# Patient Record
Sex: FEMALE | Race: WHITE | NOT HISPANIC OR LATINO | URBAN - METROPOLITAN AREA
[De-identification: names, ages, dates, MRNs, and addresses within clinical notes are randomized per-mention and may not be internally consistent; named-entity substitution may affect disease eponyms.]

---

## 2017-05-17 ENCOUNTER — OFFICE (OUTPATIENT)
Dept: URBAN - METROPOLITAN AREA CLINIC 33 | Facility: CLINIC | Age: 77
End: 2017-05-17
Payer: OTHER GOVERNMENT

## 2017-05-17 VITALS
HEART RATE: 78 BPM | TEMPERATURE: 97.5 F | DIASTOLIC BLOOD PRESSURE: 62 MMHG | HEIGHT: 66 IN | WEIGHT: 149 LBS | SYSTOLIC BLOOD PRESSURE: 99 MMHG

## 2017-05-17 DIAGNOSIS — K21.0 GASTRO-ESOPHAGEAL REFLUX DISEASE WITH ESOPHAGITIS: ICD-10-CM

## 2017-05-17 DIAGNOSIS — M34.89 OTHER SYSTEMIC SCLEROSIS: ICD-10-CM

## 2017-05-17 PROCEDURE — 99213 OFFICE O/P EST LOW 20 MIN: CPT

## 2018-05-01 ENCOUNTER — OFFICE (OUTPATIENT)
Dept: URBAN - METROPOLITAN AREA CLINIC 101 | Facility: CLINIC | Age: 78
End: 2018-05-01
Payer: OTHER GOVERNMENT

## 2018-05-01 VITALS
WEIGHT: 150 LBS | HEIGHT: 66 IN | TEMPERATURE: 97.9 F | HEART RATE: 75 BPM | SYSTOLIC BLOOD PRESSURE: 169 MMHG | DIASTOLIC BLOOD PRESSURE: 86 MMHG

## 2018-05-01 DIAGNOSIS — K21.0 GASTRO-ESOPHAGEAL REFLUX DISEASE WITH ESOPHAGITIS: ICD-10-CM

## 2018-05-01 DIAGNOSIS — K59.00 CONSTIPATION, UNSPECIFIED: ICD-10-CM

## 2018-05-01 DIAGNOSIS — M34.89 OTHER SYSTEMIC SCLEROSIS: ICD-10-CM

## 2018-05-01 PROCEDURE — 99214 OFFICE O/P EST MOD 30 MIN: CPT

## 2019-06-18 ENCOUNTER — OFFICE (OUTPATIENT)
Dept: URBAN - METROPOLITAN AREA CLINIC 33 | Facility: CLINIC | Age: 79
End: 2019-06-18
Payer: OTHER GOVERNMENT

## 2019-06-18 VITALS
TEMPERATURE: 97.9 F | DIASTOLIC BLOOD PRESSURE: 102 MMHG | HEART RATE: 83 BPM | WEIGHT: 151 LBS | SYSTOLIC BLOOD PRESSURE: 172 MMHG | SYSTOLIC BLOOD PRESSURE: 145 MMHG | HEIGHT: 66 IN | DIASTOLIC BLOOD PRESSURE: 92 MMHG

## 2019-06-18 DIAGNOSIS — K21.0 GASTRO-ESOPHAGEAL REFLUX DISEASE WITH ESOPHAGITIS: ICD-10-CM

## 2019-06-18 DIAGNOSIS — M34.89 OTHER SYSTEMIC SCLEROSIS: ICD-10-CM

## 2019-06-18 DIAGNOSIS — K59.00 CONSTIPATION, UNSPECIFIED: ICD-10-CM

## 2019-06-18 PROCEDURE — 99213 OFFICE O/P EST LOW 20 MIN: CPT

## 2020-01-23 ENCOUNTER — OFFICE (OUTPATIENT)
Dept: URBAN - METROPOLITAN AREA CLINIC 33 | Facility: CLINIC | Age: 80
End: 2020-01-23
Payer: OTHER GOVERNMENT

## 2020-01-23 VITALS
SYSTOLIC BLOOD PRESSURE: 145 MMHG | HEIGHT: 66 IN | TEMPERATURE: 97.8 F | HEART RATE: 88 BPM | DIASTOLIC BLOOD PRESSURE: 82 MMHG | WEIGHT: 153 LBS

## 2020-01-23 DIAGNOSIS — K21.0 GASTRO-ESOPHAGEAL REFLUX DISEASE WITH ESOPHAGITIS: ICD-10-CM

## 2020-01-23 DIAGNOSIS — K59.00 CONSTIPATION, UNSPECIFIED: ICD-10-CM

## 2020-01-23 DIAGNOSIS — M34.89 OTHER SYSTEMIC SCLEROSIS: ICD-10-CM

## 2020-01-23 PROCEDURE — 99213 OFFICE O/P EST LOW 20 MIN: CPT

## 2020-07-23 ENCOUNTER — OFFICE (OUTPATIENT)
Dept: URBAN - METROPOLITAN AREA CLINIC 34 | Facility: CLINIC | Age: 80
End: 2020-07-23
Payer: OTHER GOVERNMENT

## 2020-07-23 VITALS
HEIGHT: 66 IN | SYSTOLIC BLOOD PRESSURE: 171 MMHG | DIASTOLIC BLOOD PRESSURE: 85 MMHG | TEMPERATURE: 98.1 F | WEIGHT: 147 LBS | HEART RATE: 72 BPM

## 2020-07-23 DIAGNOSIS — M34.89 OTHER SYSTEMIC SCLEROSIS: ICD-10-CM

## 2020-07-23 DIAGNOSIS — K59.00 CONSTIPATION, UNSPECIFIED: ICD-10-CM

## 2020-07-23 DIAGNOSIS — K21.0 GASTRO-ESOPHAGEAL REFLUX DISEASE WITH ESOPHAGITIS: ICD-10-CM

## 2020-07-23 PROCEDURE — 99213 OFFICE O/P EST LOW 20 MIN: CPT | Performed by: INTERNAL MEDICINE

## 2021-01-13 ENCOUNTER — OFFICE (OUTPATIENT)
Dept: URBAN - METROPOLITAN AREA TELEHEALTH 3 | Facility: TELEHEALTH | Age: 81
End: 2021-01-13
Payer: OTHER GOVERNMENT

## 2021-01-13 VITALS — HEIGHT: 66 IN | WEIGHT: 147 LBS

## 2021-01-13 DIAGNOSIS — K21.00 GASTRO-ESOPHAGEAL REFLUX DISEASE WITH ESOPHAGITIS, WITHOUT B: ICD-10-CM

## 2021-01-13 DIAGNOSIS — M34.89 OTHER SYSTEMIC SCLEROSIS: ICD-10-CM

## 2021-01-13 DIAGNOSIS — K59.00 CONSTIPATION, UNSPECIFIED: ICD-10-CM

## 2021-01-13 PROCEDURE — 99213 OFFICE O/P EST LOW 20 MIN: CPT | Mod: 95 | Performed by: INTERNAL MEDICINE

## 2021-01-13 NOTE — SERVICEHPINOTES
PATIENT VERIFIED BY DATE OF BIRTH AND NAME. Patient has been consented for this telecommunication visit. Pt presents for follow up. GI-wise, she is generally doing well. Stable overall symptoms. She is now working with Dr. Patel for scleroderma and has plans for pulmonary evaluation in the coming months. She continues to struggle with weakness and fatigue, now working with PT again due to muscular issues and dyspnea.She continues on prevacid 30mg BID and occasional Pepcid QHS (along with candy). GERD symptoms generally controlled. Diet is healthy, eats regularly. No dysphagia, but periodic throat clearing. No abdominal pain. BMs continue to be irregular sometimes constipated x2 days, but then can have multiple BMs . Decent regulation with use of stool softener and activia. Healthy diet, lots of fiber. No blood in stools. Previously unsuccessfully tried miralax. Exercise somewhat limited recently, but working with PT as mentioned above. Previous EGD 9/2016 showed mild nonerosive esophagitis and gastritis as well as a hiatal hernia.10-point ROS completed with patient, pertinent positives/negatives outlined above.

## 2021-05-18 ENCOUNTER — OFFICE (OUTPATIENT)
Dept: URBAN - METROPOLITAN AREA TELEHEALTH 12 | Facility: TELEHEALTH | Age: 81
End: 2021-05-18
Payer: OTHER GOVERNMENT

## 2021-05-18 VITALS — HEIGHT: 65 IN | WEIGHT: 147 LBS

## 2021-05-18 DIAGNOSIS — A04.72 ENTEROCOLITIS DUE TO CLOSTRIDIUM DIFFICILE, NOT SPECIFIED AS: ICD-10-CM

## 2021-05-18 DIAGNOSIS — K21.0 GASTRO-ESOPHAGEAL REFLUX DISEASE WITH ESOPHAGITIS: ICD-10-CM

## 2021-05-18 PROCEDURE — 99442: CPT | Performed by: NURSE PRACTITIONER

## 2021-05-18 NOTE — SERVICEHPINOTES
PATIENT VERIFIED BY DATE OF BIRTH AND NAME. Patient has been consented for this telecommunication visit. Treated with Vanco for C diff. It worked. Now for the first time, has a fairy normal movement. Slow motility issues.  BROccasional bright red blood seen on paper tissues, she thinks it's from the hemorrhoids. Denies melena, abdominal pain or weight loss. Denies nausea, vomiting, dysphagia or stomach pain. The acid reflux is managed with prevacid 30 mg BID, has been taking this for years. Takes pepcid AC prn in the evening. ROS as stated above, otherwise negative.BR

## 2021-08-20 ENCOUNTER — OFFICE (OUTPATIENT)
Dept: URBAN - METROPOLITAN AREA CLINIC 34 | Facility: CLINIC | Age: 81
End: 2021-08-20
Payer: OTHER GOVERNMENT

## 2021-08-20 VITALS — HEIGHT: 65 IN

## 2021-08-20 DIAGNOSIS — R10.13 EPIGASTRIC PAIN: ICD-10-CM

## 2021-08-20 DIAGNOSIS — K52.9 NONINFECTIVE GASTROENTERITIS AND COLITIS, UNSPECIFIED: ICD-10-CM

## 2021-08-20 DIAGNOSIS — K21.0 GASTRO-ESOPHAGEAL REFLUX DISEASE WITH ESOPHAGITIS: ICD-10-CM

## 2021-08-20 DIAGNOSIS — K62.5 HEMORRHAGE OF ANUS AND RECTUM: ICD-10-CM

## 2021-08-20 DIAGNOSIS — A04.72 ENTEROCOLITIS DUE TO CLOSTRIDIUM DIFFICILE, NOT SPECIFIED AS: ICD-10-CM

## 2021-08-20 DIAGNOSIS — M34.89 OTHER SYSTEMIC SCLEROSIS: ICD-10-CM

## 2021-08-20 PROCEDURE — 99215 OFFICE O/P EST HI 40 MIN: CPT | Performed by: INTERNAL MEDICINE

## 2021-09-28 ENCOUNTER — OFFICE (OUTPATIENT)
Dept: URBAN - METROPOLITAN AREA TELEHEALTH 3 | Facility: TELEHEALTH | Age: 81
End: 2021-09-28
Payer: OTHER GOVERNMENT

## 2021-09-28 VITALS — HEIGHT: 65 IN | WEIGHT: 135 LBS

## 2021-09-28 DIAGNOSIS — M34.89 OTHER SYSTEMIC SCLEROSIS: ICD-10-CM

## 2021-09-28 DIAGNOSIS — K92.1 MELENA: ICD-10-CM

## 2021-09-28 DIAGNOSIS — R19.7 DIARRHEA, UNSPECIFIED: ICD-10-CM

## 2021-09-28 PROCEDURE — 99214 OFFICE O/P EST MOD 30 MIN: CPT | Performed by: PHYSICIAN ASSISTANT

## 2021-09-28 RX ORDER — BUDESONIDE 9 MG/1
TABLET, EXTENDED RELEASE ORAL
Qty: 30 | Refills: 1 | Status: COMPLETED
Start: 2021-09-28 | End: 2022-01-27

## 2021-09-28 NOTE — SERVICEHPINOTES
PATIENT VERIFIED BY DATE OF BIRTH AND NAME. Patient has been consented for this telecommunication visit.
alec michael
80 yo female presents with ongoing bloody diarrhea. Her stool tests were negative for C diff (both DNA and toxin x 3) but fecal calpro elevated. Recent CT showed colitis from hepatic flexure to distal transverse colon, maybe sigmoid too otherwise ok. ER/admission advised vs trial of Uceris outpatient with colonoscopy to be scheduled. She prefers to avoid going to the hospital. She has had worsened diarrhea up to 12-15x/day. Bleeding has improved somewhat. No abdominal pain or fever. Feels full quickly and has lost weight. Has Prevalite powder she has been using 3x/day without much benefit. Denies N/V. Has been feeling weak and not well for weeks now. alec michael ROS as above, otherwise negative. alec michaelPrior hx: She was hospitalized early 7/4/2021 to 7/10/2021 for postoperative left 4th digit cellulitis and osteomyelitis underwent I&ampD along with removal of hardware and treatment with antibiotics (and thrombophlebitis from indwelling peripheral IV).As a result of her recent ID issues, she has had lots of gastrointestinal complaints - gas, bloating, abdominal discomfort, diarrhea, blood per rectum also feeling weak, nervous, anxious.
alec michael Had C diff 4/2021 treated with vancomycin may have had it again during hospitalization 7/2021.Prevacid once daily in the morning, Pepcid once daily in the morning PRN.EGD 9/2016 showed mild nonerosive esophagitis and gastritis as well as a hiatal hernia. Uncertain when last colonoscopy was done.alec

## 2021-10-05 ENCOUNTER — ON CAMPUS - OUTPATIENT (OUTPATIENT)
Dept: URBAN - METROPOLITAN AREA HOSPITAL 16 | Facility: HOSPITAL | Age: 81
End: 2021-10-05
Payer: OTHER GOVERNMENT

## 2021-10-05 DIAGNOSIS — K62.5 HEMORRHAGE OF ANUS AND RECTUM: ICD-10-CM

## 2021-10-05 DIAGNOSIS — K51.90 ULCERATIVE COLITIS, UNSPECIFIED, WITHOUT COMPLICATIONS: ICD-10-CM

## 2021-10-05 PROCEDURE — 45380 COLONOSCOPY AND BIOPSY: CPT | Performed by: INTERNAL MEDICINE

## 2021-11-02 ENCOUNTER — OFFICE (OUTPATIENT)
Dept: URBAN - METROPOLITAN AREA TELEHEALTH 7 | Facility: TELEHEALTH | Age: 81
End: 2021-11-02
Payer: OTHER GOVERNMENT

## 2021-11-02 VITALS — WEIGHT: 137 LBS | HEIGHT: 65 IN

## 2021-11-02 DIAGNOSIS — M34.89 OTHER SYSTEMIC SCLEROSIS: ICD-10-CM

## 2021-11-02 DIAGNOSIS — K51.00 ULCERATIVE (CHRONIC) PANCOLITIS WITHOUT COMPLICATIONS: ICD-10-CM

## 2021-11-02 PROCEDURE — 99214 OFFICE O/P EST MOD 30 MIN: CPT | Performed by: PHYSICIAN ASSISTANT

## 2021-11-02 RX ORDER — MESALAMINE 1.2 G/1
4.8 TABLET, DELAYED RELEASE ORAL
Qty: 360 | Refills: 3 | Status: ACTIVE

## 2021-11-02 NOTE — SERVICEHPINOTES
PATIENT VERIFIED BY DATE OF BIRTH AND NAME. Patient has been consented for this telecommunication visit.
br
br81 yo female presents for f/u bloody diarrhea with recent diagnosis of ulcerative colitis. At last visit on 9/28 she was started on Uceris which started helping her pretty quickly. 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 On the Uceris, she is having 2-3 formed BMs per day. Infrequent bleeding at this point. No other concerns today. br
brIn late August, stool tests were negative for C diff (both DNA and toxin x 3) but fecal calpro elevated. CT showed colitis from hepatic flexure to distal transverse colon, maybe sigmoid too otherwise ok. ER/admission advised vs trial of Uceris outpatient with colonoscopy to be scheduled. She was having bloody diarrhea up to 12-15x/day.ROS as above, otherwise negative. Prior hx: She was hospitalized early 7/4/2021 to 7/10/2021 for postoperative left 4th digit cellulitis and osteomyelitis underwent I&ampD along with removal of hardware and treatment with antibiotics (and thrombophlebitis from indwelling peripheral IV).As a result of her recent ID issues, she has had lots of gastrointestinal complaints - gas, bloating, abdominal discomfort, diarrhea, blood per rectum also feeling weak, nervous, anxious.Had C diff 4/2021 treated with vancomycin may have had it again during hospitalization 7/2021.Prevacid once daily in the morning, Pepcid once daily in the morning PRN.EGD 9/2016 showed mild nonerosive esophagitis and gastritis as well as a hiatal hernia. Uncertain when last colonoscopy was done.

## 2022-01-27 ENCOUNTER — OFFICE (OUTPATIENT)
Dept: URBAN - METROPOLITAN AREA CLINIC 34 | Facility: CLINIC | Age: 82
End: 2022-01-27
Payer: OTHER GOVERNMENT

## 2022-01-27 VITALS
SYSTOLIC BLOOD PRESSURE: 166 MMHG | HEART RATE: 74 BPM | HEIGHT: 65 IN | DIASTOLIC BLOOD PRESSURE: 83 MMHG | TEMPERATURE: 97.6 F | WEIGHT: 140 LBS

## 2022-01-27 DIAGNOSIS — M34.89 OTHER SYSTEMIC SCLEROSIS: ICD-10-CM

## 2022-01-27 DIAGNOSIS — K51.00 ULCERATIVE (CHRONIC) PANCOLITIS WITHOUT COMPLICATIONS: ICD-10-CM

## 2022-01-27 DIAGNOSIS — K21.00 GASTRO-ESOPHAGEAL REFLUX DISEASE WITH ESOPHAGITIS, WITHOUT B: ICD-10-CM

## 2022-01-27 PROCEDURE — 99213 OFFICE O/P EST LOW 20 MIN: CPT | Performed by: INTERNAL MEDICINE

## 2022-08-18 ENCOUNTER — AMBULATORY SURGICAL CENTER (OUTPATIENT)
Dept: URBAN - METROPOLITAN AREA SURGERY 23 | Facility: SURGERY | Age: 82
End: 2022-08-18
Payer: OTHER GOVERNMENT

## 2022-08-18 DIAGNOSIS — K31.89 OTHER DISEASES OF STOMACH AND DUODENUM: ICD-10-CM

## 2022-08-18 DIAGNOSIS — K21.9 GASTRO-ESOPHAGEAL REFLUX DISEASE WITHOUT ESOPHAGITIS: ICD-10-CM

## 2022-08-18 DIAGNOSIS — R05.9 COUGH, UNSPECIFIED: ICD-10-CM

## 2022-08-18 PROCEDURE — 43239 EGD BIOPSY SINGLE/MULTIPLE: CPT | Performed by: INTERNAL MEDICINE

## 2022-10-04 ENCOUNTER — OFFICE (OUTPATIENT)
Dept: URBAN - METROPOLITAN AREA CLINIC 34 | Facility: CLINIC | Age: 82
End: 2022-10-04
Payer: OTHER GOVERNMENT

## 2022-10-04 VITALS
WEIGHT: 135 LBS | SYSTOLIC BLOOD PRESSURE: 140 MMHG | HEART RATE: 87 BPM | DIASTOLIC BLOOD PRESSURE: 77 MMHG | HEIGHT: 65 IN | TEMPERATURE: 97.5 F

## 2022-10-04 DIAGNOSIS — R05.9 COUGH, UNSPECIFIED: ICD-10-CM

## 2022-10-04 DIAGNOSIS — K21.00 GASTRO-ESOPHAGEAL REFLUX DISEASE WITH ESOPHAGITIS, WITHOUT B: ICD-10-CM

## 2022-10-04 DIAGNOSIS — K51.00 ULCERATIVE (CHRONIC) PANCOLITIS WITHOUT COMPLICATIONS: ICD-10-CM

## 2022-10-04 DIAGNOSIS — K44.9 DIAPHRAGMATIC HERNIA WITHOUT OBSTRUCTION OR GANGRENE: ICD-10-CM

## 2022-10-04 DIAGNOSIS — M34.89 OTHER SYSTEMIC SCLEROSIS: ICD-10-CM

## 2022-10-04 PROCEDURE — 99214 OFFICE O/P EST MOD 30 MIN: CPT | Performed by: INTERNAL MEDICINE

## 2023-02-01 ENCOUNTER — OFFICE (OUTPATIENT)
Dept: URBAN - METROPOLITAN AREA CLINIC 102 | Facility: CLINIC | Age: 83
End: 2023-02-01
Payer: OTHER GOVERNMENT

## 2023-02-01 VITALS
DIASTOLIC BLOOD PRESSURE: 83 MMHG | HEIGHT: 65 IN | TEMPERATURE: 98.3 F | WEIGHT: 137 LBS | SYSTOLIC BLOOD PRESSURE: 156 MMHG | HEART RATE: 83 BPM

## 2023-02-01 DIAGNOSIS — K51.00 ULCERATIVE (CHRONIC) PANCOLITIS WITHOUT COMPLICATIONS: ICD-10-CM

## 2023-02-01 DIAGNOSIS — K21.00 GASTRO-ESOPHAGEAL REFLUX DISEASE WITH ESOPHAGITIS, WITHOUT B: ICD-10-CM

## 2023-02-01 DIAGNOSIS — M34.89 OTHER SYSTEMIC SCLEROSIS: ICD-10-CM

## 2023-02-01 DIAGNOSIS — K44.9 DIAPHRAGMATIC HERNIA WITHOUT OBSTRUCTION OR GANGRENE: ICD-10-CM

## 2023-02-01 PROCEDURE — 99213 OFFICE O/P EST LOW 20 MIN: CPT | Performed by: INTERNAL MEDICINE

## 2023-05-05 ENCOUNTER — OFFICE (OUTPATIENT)
Dept: URBAN - METROPOLITAN AREA CLINIC 34 | Facility: CLINIC | Age: 83
End: 2023-05-05
Payer: OTHER GOVERNMENT

## 2023-05-05 VITALS
HEART RATE: 83 BPM | SYSTOLIC BLOOD PRESSURE: 151 MMHG | WEIGHT: 141 LBS | HEIGHT: 65 IN | DIASTOLIC BLOOD PRESSURE: 72 MMHG | TEMPERATURE: 97.6 F

## 2023-05-05 DIAGNOSIS — K21.00 GASTRO-ESOPHAGEAL REFLUX DISEASE WITH ESOPHAGITIS, WITHOUT B: ICD-10-CM

## 2023-05-05 DIAGNOSIS — M34.89 OTHER SYSTEMIC SCLEROSIS: ICD-10-CM

## 2023-05-05 DIAGNOSIS — K44.9 DIAPHRAGMATIC HERNIA WITHOUT OBSTRUCTION OR GANGRENE: ICD-10-CM

## 2023-05-05 DIAGNOSIS — K51.00 ULCERATIVE (CHRONIC) PANCOLITIS WITHOUT COMPLICATIONS: ICD-10-CM

## 2023-05-05 PROCEDURE — 99213 OFFICE O/P EST LOW 20 MIN: CPT | Performed by: INTERNAL MEDICINE

## 2023-12-07 ENCOUNTER — OFFICE (OUTPATIENT)
Dept: URBAN - METROPOLITAN AREA CLINIC 102 | Facility: CLINIC | Age: 83
End: 2023-12-07
Payer: OTHER GOVERNMENT

## 2023-12-07 VITALS
HEART RATE: 78 BPM | SYSTOLIC BLOOD PRESSURE: 132 MMHG | DIASTOLIC BLOOD PRESSURE: 76 MMHG | HEIGHT: 65 IN | TEMPERATURE: 97.9 F | WEIGHT: 136 LBS

## 2023-12-07 DIAGNOSIS — M34.89 OTHER SYSTEMIC SCLEROSIS: ICD-10-CM

## 2023-12-07 DIAGNOSIS — K21.00 GASTRO-ESOPHAGEAL REFLUX DISEASE WITH ESOPHAGITIS, WITHOUT B: ICD-10-CM

## 2023-12-07 DIAGNOSIS — K51.00 ULCERATIVE (CHRONIC) PANCOLITIS WITHOUT COMPLICATIONS: ICD-10-CM

## 2023-12-07 DIAGNOSIS — K44.9 DIAPHRAGMATIC HERNIA WITHOUT OBSTRUCTION OR GANGRENE: ICD-10-CM

## 2023-12-07 PROCEDURE — 99214 OFFICE O/P EST MOD 30 MIN: CPT | Performed by: INTERNAL MEDICINE

## 2024-06-05 ENCOUNTER — OFFICE (OUTPATIENT)
Dept: URBAN - METROPOLITAN AREA CLINIC 34 | Facility: CLINIC | Age: 84
End: 2024-06-05
Payer: MEDICARE

## 2024-06-05 VITALS
HEIGHT: 65 IN | DIASTOLIC BLOOD PRESSURE: 68 MMHG | TEMPERATURE: 97.7 F | HEART RATE: 91 BPM | WEIGHT: 139 LBS | SYSTOLIC BLOOD PRESSURE: 114 MMHG

## 2024-06-05 DIAGNOSIS — K51.00 ULCERATIVE (CHRONIC) PANCOLITIS WITHOUT COMPLICATIONS: ICD-10-CM

## 2024-06-05 DIAGNOSIS — K44.9 DIAPHRAGMATIC HERNIA WITHOUT OBSTRUCTION OR GANGRENE: ICD-10-CM

## 2024-06-05 DIAGNOSIS — R05.3 CHRONIC COUGH: ICD-10-CM

## 2024-06-05 DIAGNOSIS — K21.00 GASTRO-ESOPHAGEAL REFLUX DISEASE WITH ESOPHAGITIS, WITHOUT B: ICD-10-CM

## 2024-06-05 PROCEDURE — 99214 OFFICE O/P EST MOD 30 MIN: CPT | Performed by: INTERNAL MEDICINE

## 2024-06-05 RX ORDER — MESALAMINE 1.2 G/1
4.8 TABLET, DELAYED RELEASE ORAL
Qty: 360 | Refills: 3 | Status: ACTIVE